# Patient Record
Sex: FEMALE | Race: WHITE | NOT HISPANIC OR LATINO | URBAN - METROPOLITAN AREA
[De-identification: names, ages, dates, MRNs, and addresses within clinical notes are randomized per-mention and may not be internally consistent; named-entity substitution may affect disease eponyms.]

---

## 2020-01-31 ENCOUNTER — COMMUNICATION - HEALTHEAST (OUTPATIENT)
Dept: SCHEDULING | Facility: CLINIC | Age: 35
End: 2020-01-31

## 2020-01-31 ENCOUNTER — RECORDS - HEALTHEAST (OUTPATIENT)
Dept: ADMINISTRATIVE | Facility: OTHER | Age: 35
End: 2020-01-31

## 2021-06-04 VITALS — WEIGHT: 232 LBS

## 2021-06-05 NOTE — TELEPHONE ENCOUNTER
"Having severe pain and inflammation.  Has a pilonidal cyst, had surgery 4 different times.   States it is infected again.   +redness, can see a pocket of white pus.   Rates pain a 7/10, laying on side, unable to sit.   Recurrent infection.   Pt feels \"like I have the flu\", has chills, has not checked temperature.  RN had patient check.   Temp 99.3, took ibuprofen 1 hour ago.     Reason for Disposition    [1] Boil > 1/2 inch across (> 12 mm; larger than a marble) AND [2] center is soft or pus colored    Fever > 100.5 F (38.1 C)    Protocols used: BOIL (SKIN ABSCESS)-A-    Pt transferred to  to make appointment to establish appointment.   Pt will go to the ED to have it evaluated due to patient requesting pain medication for the procedure. RN advised the clinic would be limited to what they could do as far as pain management.   RN advised patient to ask for a spray to help numb the area before given lidocaine, RN is unsure if they have this at the ED but pt will ask.     Pt stated understanding and had no further questions.    Yu Lima, RN   [facility] RN Triage    "